# Patient Record
Sex: MALE | HISPANIC OR LATINO | URBAN - METROPOLITAN AREA
[De-identification: names, ages, dates, MRNs, and addresses within clinical notes are randomized per-mention and may not be internally consistent; named-entity substitution may affect disease eponyms.]

---

## 2018-10-26 ENCOUNTER — APPOINTMENT (OUTPATIENT)
Dept: URBAN - METROPOLITAN AREA CLINIC 189 | Age: 33
Setting detail: DERMATOLOGY
End: 2018-10-29

## 2018-10-26 DIAGNOSIS — R21 RASH AND OTHER NONSPECIFIC SKIN ERUPTION: ICD-10-CM

## 2018-10-26 PROBLEM — L20.84 INTRINSIC (ALLERGIC) ECZEMA: Status: ACTIVE | Noted: 2018-10-26

## 2018-10-26 PROBLEM — L70.0 ACNE VULGARIS: Status: ACTIVE | Noted: 2018-10-26

## 2018-10-26 PROCEDURE — OTHER PRESCRIPTION: OTHER

## 2018-10-26 PROCEDURE — OTHER BIOPSY BY PUNCH METHOD: OTHER

## 2018-10-26 PROCEDURE — OTHER MIPS QUALITY: OTHER

## 2018-10-26 PROCEDURE — OTHER TREATMENT REGIMEN: OTHER

## 2018-10-26 PROCEDURE — OTHER COUNSELING: OTHER

## 2018-10-26 PROCEDURE — 11100: CPT

## 2018-10-26 RX ORDER — DESOXIMETASONE 0.5 MG/G
CREAM TOPICAL
Qty: 1 | Refills: 2 | Status: ERX | COMMUNITY
Start: 2018-10-26

## 2018-10-26 ASSESSMENT — LOCATION ZONE DERM: LOCATION ZONE: LEG

## 2018-10-26 ASSESSMENT — LOCATION DETAILED DESCRIPTION DERM
LOCATION DETAILED: RIGHT PROXIMAL PRETIBIAL REGION
LOCATION DETAILED: LEFT PROXIMAL PRETIBIAL REGION

## 2018-10-26 ASSESSMENT — LOCATION SIMPLE DESCRIPTION DERM
LOCATION SIMPLE: LEFT PRETIBIAL REGION
LOCATION SIMPLE: RIGHT PRETIBIAL REGION

## 2018-10-26 NOTE — PROCEDURE: TREATMENT REGIMEN
Otc Regimen: Vaseline: apply after Topicort has dried to rash affected areas for moisturizing
Discontinue Regimen: All other lotions/creams and OTC hydrocortisone cream
Detail Level: Simple

## 2018-10-26 NOTE — PROCEDURE: BIOPSY BY PUNCH METHOD
Patient Will Remove Sutures At Home?: No
Anesthesia Volume In Cc (Will Not Render If 0): 0.5
Notification Instructions: Patient will be notified of biopsy results. However, patient instructed to call the office if not contacted within 2 weeks.
Wound Care: Vaseline
Suture Removal: 10 days
Dressing: telfa dressing
Was A Bandage Applied: Yes
X Size Of Lesion In Cm (Optional): 0
Epidermal Sutures: 4-0 Nylon
Biopsy Type: H and E
Body Location Override (Optional - Billing Will Still Be Based On Selected Body Map Location If Applicable): L leg
Anesthesia Type: 1% lidocaine with epinephrine and a 1:10 solution of 8.4% sodium bicarbonate
Home Suture Removal Text: Patient was provided a home suture removal kit and will remove their sutures at home.  If they have any questions or difficulties they will call the office.
Punch Size In Mm: 4
Consent: Written consent was obtained and risks were reviewed including but not limited to scarring, infection, bleeding, scabbing, incomplete removal, nerve damage and allergy to anesthesia.
Detail Level: Simple
Billing Type: Third-Party Bill
Hemostasis: None
Post-Care Instructions: I reviewed with the patient in detail post-care instructions. Patient is to keep the biopsy site dry overnight, and then apply bacitracin twice daily until healed. Patient may apply hydrogen peroxide soaks to remove any crusting.

## 2018-11-06 ENCOUNTER — APPOINTMENT (OUTPATIENT)
Dept: URBAN - METROPOLITAN AREA CLINIC 189 | Age: 33
Setting detail: DERMATOLOGY
End: 2018-11-06

## 2018-11-06 DIAGNOSIS — L259 CONTACT DERMATITIS AND OTHER ECZEMA, UNSPECIFIED CAUSE: ICD-10-CM

## 2018-11-06 PROBLEM — I10 ESSENTIAL (PRIMARY) HYPERTENSION: Status: ACTIVE | Noted: 2018-11-06

## 2018-11-06 PROBLEM — E78.5 HYPERLIPIDEMIA, UNSPECIFIED: Status: ACTIVE | Noted: 2018-11-06

## 2018-11-06 PROBLEM — L30.8 OTHER SPECIFIED DERMATITIS: Status: ACTIVE | Noted: 2018-11-06

## 2018-11-06 PROCEDURE — OTHER TREATMENT REGIMEN: OTHER

## 2018-11-06 PROCEDURE — 99212 OFFICE O/P EST SF 10 MIN: CPT

## 2018-11-06 PROCEDURE — OTHER COUNSELING: OTHER

## 2018-11-06 PROCEDURE — OTHER MIPS QUALITY: OTHER

## 2018-11-06 ASSESSMENT — LOCATION SIMPLE DESCRIPTION DERM
LOCATION SIMPLE: RIGHT THIGH
LOCATION SIMPLE: LEFT THIGH

## 2018-11-06 ASSESSMENT — LOCATION ZONE DERM: LOCATION ZONE: LEG

## 2018-11-06 ASSESSMENT — LOCATION DETAILED DESCRIPTION DERM
LOCATION DETAILED: RIGHT ANTERIOR PROXIMAL THIGH
LOCATION DETAILED: LEFT ANTERIOR PROXIMAL THIGH

## 2018-11-06 NOTE — PROCEDURE: TREATMENT REGIMEN
Discontinue Regimen: All previous lotions and creams used for the rash. Apply only what is recommended today
Plan: Sutures removed from L leg, patient tolerated well. Pathology discussed in detail. Vaseline and bandage apllied
Continue Regimen: Betamethasone as previously directed
Otc Regimen: Continue cleansing with Aveeno body wash. Patient to moisturize twice daily with Cetaphil, CeraVe, or Aveeno. Patient to switch detergent/dryer sheets to hypoallergenic products such as All Free&Clear
Detail Level: Simple

## 2018-11-06 NOTE — PROCEDURE: MIPS QUALITY
Quality 110: Preventive Care And Screening: Influenza Immunization: Influenza Immunization Ordered or Recommended, but not Administered due to system reason
Quality 226: Preventive Care And Screening: Tobacco Use: Screening And Cessation Intervention: Patient screened for tobacco use and is an ex/non-smoker
Detail Level: Generalized